# Patient Record
Sex: FEMALE | Race: WHITE | NOT HISPANIC OR LATINO | URBAN - METROPOLITAN AREA
[De-identification: names, ages, dates, MRNs, and addresses within clinical notes are randomized per-mention and may not be internally consistent; named-entity substitution may affect disease eponyms.]

---

## 2017-05-09 ENCOUNTER — OUTPATIENT (OUTPATIENT)
Dept: OUTPATIENT SERVICES | Facility: HOSPITAL | Age: 37
LOS: 1 days | Discharge: HOME | End: 2017-05-09
Payer: COMMERCIAL

## 2017-05-09 VITALS
OXYGEN SATURATION: 98 % | RESPIRATION RATE: 20 BRPM | HEIGHT: 65 IN | HEART RATE: 101 BPM | TEMPERATURE: 98 F | WEIGHT: 293 LBS | DIASTOLIC BLOOD PRESSURE: 101 MMHG | SYSTOLIC BLOOD PRESSURE: 140 MMHG

## 2017-05-09 DIAGNOSIS — Z98.890 OTHER SPECIFIED POSTPROCEDURAL STATES: Chronic | ICD-10-CM

## 2017-05-09 LAB — HCG UR QL: NEGATIVE — SIGNIFICANT CHANGE UP

## 2017-05-09 PROCEDURE — 88305 TISSUE EXAM BY PATHOLOGIST: CPT | Mod: 26

## 2017-05-09 PROCEDURE — 88313 SPECIAL STAINS GROUP 2: CPT | Mod: 26

## 2017-05-09 PROCEDURE — 88312 SPECIAL STAINS GROUP 1: CPT | Mod: 26

## 2017-05-09 RX ORDER — SODIUM CHLORIDE 9 MG/ML
1000 INJECTION INTRAMUSCULAR; INTRAVENOUS; SUBCUTANEOUS
Qty: 0 | Refills: 0 | Status: DISCONTINUED | OUTPATIENT
Start: 2017-05-09 | End: 2017-05-24

## 2017-05-09 RX ORDER — NEBIVOLOL HYDROCHLORIDE 5 MG/1
1 TABLET ORAL
Qty: 0 | Refills: 0 | COMMUNITY

## 2017-05-09 RX ORDER — METFORMIN HYDROCHLORIDE 850 MG/1
1 TABLET ORAL
Qty: 0 | Refills: 0 | COMMUNITY

## 2017-05-09 RX ORDER — FENOFIBRIC ACID 105 MG/1
1 TABLET ORAL
Qty: 0 | Refills: 0 | COMMUNITY

## 2017-05-09 RX ORDER — AMLODIPINE AND VALSARTAN 5; 320 MG/1; MG/1
1 TABLET, FILM COATED ORAL
Qty: 0 | Refills: 0 | COMMUNITY

## 2017-05-10 LAB — SURGICAL PATHOLOGY FINAL REPORT - CH: SIGNIFICANT CHANGE UP

## 2017-05-18 DIAGNOSIS — K21.0 GASTRO-ESOPHAGEAL REFLUX DISEASE WITH ESOPHAGITIS: ICD-10-CM

## 2017-05-18 DIAGNOSIS — E11.9 TYPE 2 DIABETES MELLITUS WITHOUT COMPLICATIONS: ICD-10-CM

## 2017-05-18 DIAGNOSIS — Z01.818 ENCOUNTER FOR OTHER PREPROCEDURAL EXAMINATION: ICD-10-CM

## 2017-05-18 DIAGNOSIS — E66.01 MORBID (SEVERE) OBESITY DUE TO EXCESS CALORIES: ICD-10-CM

## 2017-05-18 DIAGNOSIS — E78.5 HYPERLIPIDEMIA, UNSPECIFIED: ICD-10-CM

## 2017-05-18 DIAGNOSIS — K29.50 UNSPECIFIED CHRONIC GASTRITIS WITHOUT BLEEDING: ICD-10-CM

## 2017-05-18 DIAGNOSIS — K44.9 DIAPHRAGMATIC HERNIA WITHOUT OBSTRUCTION OR GANGRENE: ICD-10-CM

## 2017-05-18 DIAGNOSIS — I10 ESSENTIAL (PRIMARY) HYPERTENSION: ICD-10-CM

## 2017-05-18 DIAGNOSIS — Z79.84 LONG TERM (CURRENT) USE OF ORAL HYPOGLYCEMIC DRUGS: ICD-10-CM

## 2018-11-14 NOTE — ASU PATIENT PROFILE, ADULT - ATTEMPT TO OOB
"  Physical Therapy Daily Treatment Note     Name: Bernadette Aquino  Clinic Number: [id number]    Therapy Diagnosis:   Encounter Diagnoses   Name Primary?    Iliotibial band syndrome, unspecified laterality     Pain in both knees, unspecified chronicity      Physician: Deniz Barba, *    Visit Date: 11/14/2018  Physician Orders: PT Eval and Treat: It band syndrome, patellofemoral program.  Medical Diagnosis from Referral:   Primary osteoarthritis of both knees  - Primary       Internal derangement of left knee       Primary osteoarthritis of left knee       It band syndrome, right       Evaluation Date: 11/6/2018  Authorization Period Expiration: 11/02/2019  Plan of Care Expiration: 01/29/2019  Visit # / Visits authorized: 2/ 30     Time In: 1005  Time Out: 1115  Total Billable Time: 55 minutes     Precautions: Standard      Subjective     Pt reports: she got an injection in her L knee yesterday. She really has not noticed much of a difference yet but has stayed off of it for the last 24 hours.    She was compliant with home exercise program.  Response to previous treatment: none stated  Functional change: none stated    Pain: 2/10  Location: left knee      Objective     Bernadette received therapeutic exercises to develop strength, endurance, flexibility and core stabilization for 40 minutes including:  Supine ITB stretch 3 x 30" B  Bridge w/ abd hold OTB 20 x 3"  sidelying clams OTB 20 x 3"  Supine SLR x 10 B  sidelying hip abduction 3 x 10 B  Prone hip extension 3 x 10 B  SL calf raises x 20 B  Wall squats w/ SB x 20    Bernadette received the following manual therapy techniques: Joint mobilizations, Soft tissue Mobilization and Friction Massage were applied to the: B knees for 15 minutes, including:  Grade III/IV patellar joint mobilizations to R knee  Cross friction massage over lateral retinaculum and IT band attachment site to R knee  rollerstick to ITB BLE    Bernadette received cold pack for 10 minutes to B knees " to decrease circulation, pain, and swelling.    Home Exercises Provided and Patient Education Provided     Education provided:   - sources of pain  - anatomy of IT band    Written Home Exercises Provided: Patient instructed to cont prior HEP.  Exercises were reviewed and Bernadette was able to demonstrate them prior to the end of the session.  Bernadette demonstrated good  understanding of the education provided.     See EMR under Media for exercises provided prior visit.    Assessment     Pt tolerated treatment well today. Pt was noted to have an extensor lag and pain with supine SLR, but the pain decreased/subided with quad activation. Pt was able to tolerate all other exercises well. Pt had some c/o knee pain during wall squats but the pain once again subsided when the pt was told to actively contract quads at the top of the rep. Pt's R ITB was noticed to be tighter than the L affecting R patellar mobility which could be contributing to some of the pt's patellofemoral pain during squats. Pt stated she had more pain with rollerstick to R ITB>L ITB.  Bernadette is progressing well towards her goals.   Pt prognosis is Good.     Pt will continue to benefit from skilled outpatient physical therapy to address the deficits listed in the problem list box on initial evaluation, provide pt/family education and to maximize pt's level of independence in the home and community environment.     Pt's spiritual, cultural and educational needs considered and pt agreeable to plan of care and goals.    Anticipated barriers to physical therapy: none    GOALS: Short Term Goals:  6 weeks  1.Report decreased L knee pain  < / =  6-7/10  to increase tolerance for shopping Progressing, NOT MET  2. Improve L SL balance EO to 30 seconds to improve balance and proprioception Progressing, NOT MET  3. Increase strength by 1/3 MMT grade in BLE  to increase tolerance for ADL and work activities. Progressing, NOT MET  4. Pt to tolerate HEP to improve ROM and  independence with ADL's Progressing, NOT MET  5. Be able to perform a DL squat with min to no deviations in order to help pt return to tennis Progressing, NOT MET     Long Term Goals: 12 weeks  1.Report decreased L pain < / = 2-3/10  to increase tolerance for shopping Progressing, NOT MET  2.Patient goal: feel better, get back to tennis at prior level Progressing, NOT MET  3.Increase strength to >/= 4+/5 in BLE  to increase tolerance for ADL and work activities. Progressing, NOT MET  4. Pt will report at CK level (40-60% impaired) on FOTO knee to demonstrate increase in LE function with every day tasks. Progressing, NOT MET  5. Improve B SLS EC to 20 seconds to improve balance and proprioception Progressing, NOT MET  6. Be able to perform a SL squat on BLE with min to no deviations in order to help pt return to tennis Progressing, NOT MET    Plan     Continue established POC focusing on B knee/hip strengthening and stability.    Camila Shipley, SPT    I certify that I was present in the room directing the student in service delivery and guiding them using my skilled judgment. As the co-signing therapist I have reviewed the students documentation and am responsible for the treatment, assessment, and plan.     Erica Najera, PT, DPT, OCS            no

## 2023-07-09 NOTE — ASU PATIENT PROFILE, ADULT - TEACHING/LEARNING DEVELOPMENTAL CONSIDERATIONS
Also reporting intermittent dry cough over last 4 weeks; lower chest of abd + BL atelectasis. RVP negative. Physical exam reassuring.   - trial IS
none